# Patient Record
Sex: MALE | Race: WHITE
[De-identification: names, ages, dates, MRNs, and addresses within clinical notes are randomized per-mention and may not be internally consistent; named-entity substitution may affect disease eponyms.]

---

## 2020-05-04 ENCOUNTER — HOSPITAL ENCOUNTER (EMERGENCY)
Dept: HOSPITAL 47 - EC | Age: 40
Discharge: LEFT BEFORE DISCHARGE | End: 2020-05-04
Payer: COMMERCIAL

## 2020-05-04 VITALS
DIASTOLIC BLOOD PRESSURE: 81 MMHG | HEART RATE: 99 BPM | SYSTOLIC BLOOD PRESSURE: 150 MMHG | RESPIRATION RATE: 20 BRPM | TEMPERATURE: 98.1 F

## 2020-05-04 DIAGNOSIS — F17.200: ICD-10-CM

## 2020-05-04 DIAGNOSIS — Z53.29: ICD-10-CM

## 2020-05-04 DIAGNOSIS — R22.0: Primary | ICD-10-CM

## 2020-05-04 PROCEDURE — 99283 EMERGENCY DEPT VISIT LOW MDM: CPT

## 2020-05-04 NOTE — ED
General Adult HPI





- General


Chief complaint: Skin/Abscess/Foreign Body


Stated complaint: infected tooth


Time Seen by Provider: 05/04/20 10:03


Source: patient


Mode of arrival: ambulatory


Limitations: no limitations





- History of Present Illness


Initial comments: 





39-year-old male presents to the emergency department for "dental infection."  

Patient states he has had left-sided facial swelling for the past 3-4 days.  

States that he had 2 unknown leftover antibiotics on Friday that he took and 

this did help with the swelling but it came back.  He denies fevers or chills.  

He does have difficulty opening his mouth past 3 cm.  Denies any significant 

pain.  States he has been using mouthwashes which have turned the left side of 

his mouth and white.Patient has no other complaints at this time including 

shortness of breath, chest pain, abdominal pain, nausea or vomiting, headache, 

or visual changes.





- Related Data


                                    Allergies











Allergy/AdvReac Type Severity Reaction Status Date / Time


 


No Known Allergies Allergy   Verified 05/04/20 10:02














Review of Systems


ROS Statement: 


Those systems with pertinent positive or pertinent negative responses have been 

documented in the HPI.





ROS Other: All systems not noted in ROS Statement are negative.





Past Medical History


Past Medical History: No Reported History


History of Any Multi-Drug Resistant Organisms: None Reported


Past Surgical History: Hernia Repair


Past Psychological History: No Psychological Hx Reported


Smoking Status: Current every day smoker


Past Alcohol Use History: Daily


Past Drug Use History: Marijuana





General Exam


Limitations: no limitations


General appearance: alert, in no apparent distress


Head exam: Present: atraumatic, normocephalic, normal inspection


Eye exam: Present: normal appearance, PERRL, EOMI.  Absent: scleral icterus, 

conjunctival injection, periorbital swelling


ENT exam: Present: normal exam, mucous membranes moist, TM's normal bilaterally,

normal external ear exam, other (Patient has significant edema noted of the left

cheek.).  Absent: normal oropharynx (Patient is able to open oropharynx 3 cm.  

He does have white plaque noted to the inside of the left cheek.  Edema is 

slightly indurated.  There is no erythema of the face.  There is no drainable 

abscess along the gumline.  No tenderness to palpation of the teeth.)


Neck exam: Present: normal inspection, full ROM.  Absent: tenderness, 

meningismus, lymphadenopathy


Respiratory exam: Present: normal lung sounds bilaterally.  Absent: respiratory 

distress, wheezes, rales, rhonchi, stridor


Cardiovascular Exam: Present: regular rate, normal rhythm, normal heart sounds. 

Absent: systolic murmur, diastolic murmur, rubs, gallop, clicks





Course





                                   Vital Signs











  05/04/20





  09:59


 


Temperature 98.1 F


 


Pulse Rate 99


 


Respiratory 20





Rate 


 


Blood Pressure 150/81


 


O2 Sat by Pulse 100





Oximetry 














Medical Decision Making





- Medical Decision Making





I discussed with the patient that I'm highly concerned about this facial 

swelling.  Discussed that I'm concerned that this is not a simple dental 

infection.  I strongly and persistently recommended blood work and a CAT scan to

further evaluate the etiology of his symptoms.  Patient adamantly refuses 

stating this has happened before and he knows it is a simple dental infection.  

He states "I'm not stupid I'll come back if it gets worse."  I discussed that 

I'm concerned for severe infection versus cancerous etiology versus other 

etiology and would again very much recommend further evaluation.  I discussed 

that if this was a severe infection and could be life-threatening.  Patient 

again refuses.  Tia JIMENEZ was present for this discussion.  I discussed leaving 

AGAINST MEDICAL ADVICE and patient does agree with this.  He is capable of 

making his own medical decisions.  Patient requesting antibiotics.  I did give 

him IM Rocephin as well as oral antibiotics and strongly recommend he return for

any worsening symptoms.  He is in agreement with this.





Disposition


Clinical Impression: 


 Swelling of left side of face





Disposition: Left Against Medical Advice


Condition: Fair


Instructions (If sedation given, give patient instructions):  Dental Abscess 

(ED)


Additional Instructions: 


Please take antibiotic as directed.  Please follow-up with your primary care 

doctor.  It is imperative that you follow up so they can ensure that this 

resolves.  It is also very important that you return here if you're having any 

worsening symptoms.


Referrals: 


Andrez Vázquez MD [REFERRING] - 1-2 days


Time of Disposition: 10:27

## 2022-06-30 ENCOUNTER — HOSPITAL ENCOUNTER (EMERGENCY)
Dept: HOSPITAL 47 - EC | Age: 42
Discharge: HOME | End: 2022-06-30
Payer: COMMERCIAL

## 2022-06-30 VITALS
HEART RATE: 106 BPM | SYSTOLIC BLOOD PRESSURE: 130 MMHG | DIASTOLIC BLOOD PRESSURE: 90 MMHG | TEMPERATURE: 97.9 F | RESPIRATION RATE: 18 BRPM

## 2022-06-30 DIAGNOSIS — L03.011: Primary | ICD-10-CM

## 2022-06-30 DIAGNOSIS — M65.9: ICD-10-CM

## 2022-06-30 DIAGNOSIS — F17.200: ICD-10-CM

## 2022-06-30 PROCEDURE — 99283 EMERGENCY DEPT VISIT LOW MDM: CPT

## 2022-06-30 NOTE — ED
Upper Extremity HPI





- General


Chief Complaint: Extremity Injury, Upper


Stated Complaint: Right finger swelling


Time Seen by Provider: 06/30/22 03:52


Source: patient, family, RN notes reviewed, old records reviewed


Mode of arrival: ambulatory


Limitations: no limitations





- History of Present Illness


Initial Comments: 





This is a 41-year-old male to the emergency department for evaluation patient 

presents today for evaluation regards to severe finger pain patient states his 

little finger has been getting increase in swelling increased in size increase 

in pain increase in tenderness increase in redness and warmth.  Patient denies 

significant injury denies any drainage.  Patient denying any fevers streaking up

his arm.


MD Complaint: Injury to:: right


Other Extremity Injury: Fingers: Right (Little finger)


Other Injuries: none


Handedness: right


Place: home


Severity scale (1-10): 10


Improves With: none


Worsens With: none


Associated Symptoms: denies other symptoms


Treatments Prior to Arrival: other (0)





- Related Data


                                  Previous Rx's











 Medication  Instructions  Recorded


 


Penicillin V Potassium [Pen Vee K] 500 mg PO Q6H 10 Days #40 tablet 05/04/20


 


Cephalexin [Keflex] 500 mg PO Q6HR #40 cap 06/30/22


 


Sulfamethox-Tmp 800-160Mg [Bactrim 2 tab PO BID #40 tab 06/30/22





-160 mg]  











                                    Allergies











Allergy/AdvReac Type Severity Reaction Status Date / Time


 


No Known Allergies Allergy   Verified 06/30/22 03:46














Review of Systems


ROS Statement: 


Those systems with pertinent positive or pertinent negative responses have been 

documented in the HPI.





ROS Other: All systems not noted in ROS Statement are negative.





Past Medical History


Past Medical History: No Reported History


History of Any Multi-Drug Resistant Organisms: None Reported


Past Surgical History: Hernia Repair


Past Psychological History: No Psychological Hx Reported


Smoking Status: Current every day smoker


Past Alcohol Use History: Daily


Past Drug Use History: Marijuana





General Exam


Limitations: no limitations


General appearance: alert, in no apparent distress


Head exam: Present: atraumatic, normocephalic, normal inspection


Eye exam: Present: normal appearance, PERRL, EOMI.  Absent: scleral icterus, 

conjunctival injection, periorbital swelling


ENT exam: Present: normal exam, mucous membranes moist


Neck exam: Present: normal inspection.  Absent: tenderness, meningismus, 

lymphadenopathy


Respiratory exam: Present: normal lung sounds bilaterally.  Absent: respiratory 

distress, wheezes, rales, rhonchi, stridor


Cardiovascular Exam: Present: regular rate, normal rhythm, normal heart sounds. 

Absent: systolic murmur, diastolic murmur, rubs, gallop, clicks


GI/Abdominal exam: Present: soft, normal bowel sounds.  Absent: distended, 

tenderness, guarding, rebound, rigid


Extremities exam: Present: normal inspection, full ROM, normal capillary refill,

other (Patient is severe little finger pain and swelling circumferential 

erythema and edema.  Patient is unable to bend finger).  Absent: tenderness, 

pedal edema, joint swelling, calf tenderness


Back exam: Present: normal inspection


Neurological exam: Present: alert, oriented X3, CN II-XII intact


Psychiatric exam: Present: normal affect, normal mood


Skin exam: Present: warm, dry, intact, normal color.  Absent: rash





Course


                                   Vital Signs











  06/30/22





  03:42


 


Temperature 97.9 F


 


Pulse Rate 106 H


 


Respiratory 18





Rate 


 


Blood Pressure 130/90


 


O2 Sat by Pulse 98





Oximetry 














- Reevaluation(s)


Reevaluation #1: 





06/30/22


Medical records reviewed


Reevaluation #2: 





06/30/22


Patient refusing admission


Reevaluation #3: 





06/30/22


Patient informed results and questions answered





Medical Decision Making





- Medical Decision Making





41 male presents today for evaluation of severe finger pain swelling edema 

patient's advised to stay in the hospital for surgical evaluation secondary to 

likely flexor tenosynovitis.  He refuses





Disposition


Clinical Impression: 


 Flexor tenosynovitis of finger, Cellulitis of right little finger





Disposition: HOME SELF-CARE


Condition: Good


Instructions (If sedation given, give patient instructions):  Cellulitis (ED)


Prescriptions: 


Sulfamethox-Tmp 800-160Mg [Bactrim -160 mg] 2 tab PO BID #40 tab


Cephalexin [Keflex] 500 mg PO Q6HR #40 cap


Is patient prescribed a controlled substance at d/c from ED?: No


Referrals: 


None,Stated [Primary Care Provider] - 1-2 days


Time of Disposition: 04:30